# Patient Record
Sex: MALE | Race: OTHER
[De-identification: names, ages, dates, MRNs, and addresses within clinical notes are randomized per-mention and may not be internally consistent; named-entity substitution may affect disease eponyms.]

---

## 2018-09-24 ENCOUNTER — HOSPITAL ENCOUNTER (INPATIENT)
Dept: HOSPITAL 54 - ER | Age: 38
LOS: 4 days | Discharge: ANOTHER HOSPITAL | DRG: 834 | End: 2018-09-28
Attending: INTERNAL MEDICINE | Admitting: INTERNAL MEDICINE
Payer: COMMERCIAL

## 2018-09-24 VITALS — SYSTOLIC BLOOD PRESSURE: 109 MMHG | DIASTOLIC BLOOD PRESSURE: 65 MMHG

## 2018-09-24 VITALS — HEIGHT: 72 IN | BODY MASS INDEX: 27.09 KG/M2 | WEIGHT: 200 LBS

## 2018-09-24 DIAGNOSIS — A41.9: ICD-10-CM

## 2018-09-24 DIAGNOSIS — F10.10: ICD-10-CM

## 2018-09-24 DIAGNOSIS — E87.6: ICD-10-CM

## 2018-09-24 DIAGNOSIS — D68.9: ICD-10-CM

## 2018-09-24 DIAGNOSIS — E83.51: ICD-10-CM

## 2018-09-24 DIAGNOSIS — C92.00: Primary | ICD-10-CM

## 2018-09-24 DIAGNOSIS — R74.0: ICD-10-CM

## 2018-09-24 DIAGNOSIS — D53.9: ICD-10-CM

## 2018-09-24 DIAGNOSIS — E83.42: ICD-10-CM

## 2018-09-24 DIAGNOSIS — N17.0: ICD-10-CM

## 2018-09-24 DIAGNOSIS — A09: ICD-10-CM

## 2018-09-24 DIAGNOSIS — E87.1: ICD-10-CM

## 2018-09-24 DIAGNOSIS — D69.6: ICD-10-CM

## 2018-09-24 DIAGNOSIS — E87.3: ICD-10-CM

## 2018-09-24 DIAGNOSIS — D50.9: ICD-10-CM

## 2018-09-24 LAB
ALBUMIN SERPL BCP-MCNC: 3.5 G/DL (ref 3.4–5)
ALP SERPL-CCNC: 88 U/L (ref 46–116)
ALT SERPL W P-5'-P-CCNC: 96 U/L (ref 12–78)
APTT PPP: 27 SEC (ref 23–34)
APTT PPP: 29 SEC (ref 23–34)
AST SERPL W P-5'-P-CCNC: 38 U/L (ref 15–37)
BASOPHILS # BLD AUTO: 0.1 /CMM (ref 0–0.2)
BASOPHILS NFR BLD AUTO: 0.2 % (ref 0–2)
BASOPHILS NFR BLD MANUAL: 0 % (ref 0–2)
BILIRUB DIRECT SERPL-MCNC: 0.3 MG/DL (ref 0–0.2)
BILIRUB SERPL-MCNC: 0.8 MG/DL (ref 0.2–1)
BUN SERPL-MCNC: 15 MG/DL (ref 7–18)
CALCIUM SERPL-MCNC: 8 MG/DL (ref 8.5–10.1)
CHLORIDE SERPL-SCNC: 98 MMOL/L (ref 98–107)
CO2 SERPL-SCNC: 26 MMOL/L (ref 21–32)
CREAT SERPL-MCNC: 1.7 MG/DL (ref 0.6–1.3)
EOSINOPHIL NFR BLD AUTO: 0.2 % (ref 0–6)
EOSINOPHIL NFR BLD MANUAL: 0 % (ref 0–4)
GLUCOSE SERPL-MCNC: 121 MG/DL (ref 74–106)
HCT VFR BLD AUTO: 25 % (ref 39–51)
HGB BLD-MCNC: 9.1 G/DL (ref 13.5–17.5)
INR PPP: 1.27 (ref 0.85–1.15)
INR PPP: 1.38 (ref 0.85–1.15)
LYMPHOCYTES NFR BLD AUTO: 6.5 /CMM (ref 0.8–4.8)
LYMPHOCYTES NFR BLD AUTO: 8.9 % (ref 20–44)
LYMPHOCYTES NFR BLD MANUAL: 18 % (ref 16–48)
MCHC RBC AUTO-ENTMCNC: 37 G/DL (ref 31–36)
MCV RBC AUTO: 94 FL (ref 80–96)
MONOCYTES NFR BLD AUTO: 58 /CMM (ref 0.1–1.3)
MONOCYTES NFR BLD AUTO: 79.9 % (ref 2–12)
MONOCYTES NFR BLD MANUAL: 70 % (ref 0–11)
NEUTROPHILS # BLD AUTO: 7.8 /CMM (ref 1.8–8.9)
NEUTROPHILS NFR BLD AUTO: 10.8 % (ref 43–81)
NEUTS BAND NFR BLD MANUAL: 2 % (ref 0–5)
NEUTS SEG NFR BLD MANUAL: 10 % (ref 42–76)
PLATELET # BLD AUTO: 52 /CMM (ref 150–450)
POTASSIUM SERPL-SCNC: 3.6 MMOL/L (ref 3.5–5.1)
PROT SERPL-MCNC: 7.5 G/DL (ref 6.4–8.2)
RBC # BLD AUTO: 2.62 MIL/UL (ref 4.5–6)
RDW COEFFICIENT OF VARIATION: 19.9 (ref 11.5–15)
SODIUM SERPL-SCNC: 130 MMOL/L (ref 136–145)
WBC NRBC COR # BLD AUTO: 72.5 K/UL (ref 4.3–11)

## 2018-09-24 PROCEDURE — A4606 OXYGEN PROBE USED W OXIMETER: HCPCS

## 2018-09-24 PROCEDURE — C9113 INJ PANTOPRAZOLE SODIUM, VIA: HCPCS

## 2018-09-24 PROCEDURE — A6402 STERILE GAUZE <= 16 SQ IN: HCPCS

## 2018-09-24 PROCEDURE — A4216 STERILE WATER/SALINE, 10 ML: HCPCS

## 2018-09-24 PROCEDURE — Z7610: HCPCS

## 2018-09-24 RX ADMIN — SODIUM CHLORIDE PRN MLS/HR: 9 INJECTION, SOLUTION INTRAVENOUS at 21:09

## 2018-09-24 RX ADMIN — DEXTROSE MONOHYDRATE PRN MG: 50 INJECTION, SOLUTION INTRAVENOUS at 23:28

## 2018-09-24 RX ADMIN — ZOLPIDEM TARTRATE PRN MG: 5 TABLET, FILM COATED ORAL at 21:32

## 2018-09-24 RX ADMIN — Medication SCH MLS/HR: at 22:18

## 2018-09-24 NOTE — NUR
PT WAS FOUND TO BE VOMITING, VOMIT WAS BROWN WITH COFFEE LIKE EMESIS, CONSULTED CHARGE LAMBERTO SHARP WHO SAID GI IS AWARE. ZOFRAN WAS GIVEN, VITALS WERE TAKEN AND TEMP .5 WAS NOTED. 
WILL CONTINUE TO MONITOR

## 2018-09-24 NOTE — NUR
Patient to ed dt abdominal pain started 4 days ago; + tenderness LLQ pain- 
radiates to the RLQ; + fever, chills, and diarrhea x 3 days; + n/v

## 2018-09-24 NOTE — NUR
PT WAS RECEIVED FROM ER, ABLE TO AMBULATE WITH SLIGHT WEAKNESS, A/O X4, TEMP OF 98.4 NOTED 
BY CNA, FAMILY PRESENT AT BEDSIDE, WILL CONTINUE TO MONITOR AND ASSESS

## 2018-09-25 VITALS — DIASTOLIC BLOOD PRESSURE: 51 MMHG | SYSTOLIC BLOOD PRESSURE: 109 MMHG

## 2018-09-25 VITALS — DIASTOLIC BLOOD PRESSURE: 62 MMHG | SYSTOLIC BLOOD PRESSURE: 110 MMHG

## 2018-09-25 VITALS — DIASTOLIC BLOOD PRESSURE: 68 MMHG | SYSTOLIC BLOOD PRESSURE: 113 MMHG

## 2018-09-25 VITALS — SYSTOLIC BLOOD PRESSURE: 127 MMHG | DIASTOLIC BLOOD PRESSURE: 85 MMHG

## 2018-09-25 VITALS — DIASTOLIC BLOOD PRESSURE: 56 MMHG | SYSTOLIC BLOOD PRESSURE: 93 MMHG

## 2018-09-25 LAB
ALBUMIN SERPL BCP-MCNC: 2.7 G/DL (ref 3.4–5)
ALP SERPL-CCNC: 63 U/L (ref 46–116)
ALT SERPL W P-5'-P-CCNC: 78 U/L (ref 12–78)
APPEARANCE UR: (no result)
AST SERPL W P-5'-P-CCNC: 38 U/L (ref 15–37)
BASOPHILS # BLD AUTO: 0.1 /CMM (ref 0–0.2)
BILIRUB SERPL-MCNC: 0.7 MG/DL (ref 0.2–1)
BILIRUB UR QL STRIP: NEGATIVE
BLASTS NFR BLD MANUAL: 4 % (ref 0–0)
BUN SERPL-MCNC: 12 MG/DL (ref 7–18)
CALCIUM SERPL-MCNC: 7.2 MG/DL (ref 8.5–10.1)
CHLORIDE SERPL-SCNC: 105 MMOL/L (ref 98–107)
CO2 SERPL-SCNC: 22 MMOL/L (ref 21–32)
COLOR UR: YELLOW
CREAT SERPL-MCNC: 1.6 MG/DL (ref 0.6–1.3)
CREAT UR-MCNC: 211.3 MG/DL (ref 30–125)
GLUCOSE SERPL-MCNC: 107 MG/DL (ref 74–106)
GLUCOSE UR STRIP-MCNC: NEGATIVE MG/DL
HCT VFR BLD AUTO: 21 % (ref 39–51)
HEMOCCULT STL QL: POSITIVE
HGB BLD-MCNC: 8.4 G/DL (ref 13.5–17.5)
HGB UR QL STRIP: (no result) ERY/UL
IRON SERPL-MCNC: 77 UG/DL (ref 50–175)
KETONES UR STRIP-MCNC: NEGATIVE MG/DL
LEUKOCYTE ESTERASE UR QL STRIP: NEGATIVE
LYMPHOCYTES NFR BLD AUTO: 5.1 /CMM (ref 0.8–4.8)
LYMPHOCYTES NFR BLD MANUAL: 10 % (ref 16–48)
MAGNESIUM SERPL-MCNC: 1.6 MG/DL (ref 1.8–2.4)
MCHC RBC AUTO-ENTMCNC: 40 G/DL (ref 31–36)
MCV RBC AUTO: 94 FL (ref 80–96)
MONOCYTES NFR BLD AUTO: 66.9 /CMM (ref 0.1–1.3)
MONOCYTES NFR BLD MANUAL: 75 % (ref 0–11)
NEUTROPHILS # BLD AUTO: 9.4 /CMM (ref 1.8–8.9)
NEUTS BAND NFR BLD MANUAL: 3 % (ref 0–5)
NEUTS SEG NFR BLD MANUAL: 8 % (ref 42–76)
NITRITE UR QL STRIP: NEGATIVE
PH UR STRIP: 6 [PH] (ref 5–8)
PHOSPHATE SERPL-MCNC: 3.6 MG/DL (ref 2.5–4.9)
PLATELET # BLD AUTO: 31 /CMM (ref 150–450)
POTASSIUM SERPL-SCNC: 3.3 MMOL/L (ref 3.5–5.1)
PROT SERPL-MCNC: 6.4 G/DL (ref 6.4–8.2)
PROT UR QL STRIP: (no result) MG/DL
PROT UR-MCNC: 135.5 MG/DL (ref 0–11.9)
RBC # BLD AUTO: 2.24 MIL/UL (ref 4.5–6)
RBC #/AREA URNS HPF: (no result) /HPF (ref 0–2)
RDW COEFFICIENT OF VARIATION: 19.7 (ref 11.5–15)
SODIUM SERPL-SCNC: 138 MMOL/L (ref 136–145)
SODIUM UR-SCNC: 17 MMOL/L (ref 40–220)
TIBC SERPL-MCNC: 158 UG/DL (ref 250–450)
UROBILINOGEN UR STRIP-MCNC: 0.2 EU/DL
WBC #/AREA URNS HPF: (no result) /HPF (ref 0–3)
WBC NRBC COR # BLD AUTO: 81.6 K/UL (ref 4.3–11)

## 2018-09-25 RX ADMIN — SODIUM CHLORIDE PRN MLS/HR: 9 INJECTION, SOLUTION INTRAVENOUS at 05:34

## 2018-09-25 RX ADMIN — MAGNESIUM SULFATE IN DEXTROSE SCH MLS/HR: 10 INJECTION, SOLUTION INTRAVENOUS at 14:57

## 2018-09-25 RX ADMIN — SODIUM CHLORIDE SCH MG: 9 INJECTION, SOLUTION INTRAVENOUS at 09:37

## 2018-09-25 RX ADMIN — Medication SCH MLS/HR: at 20:47

## 2018-09-25 RX ADMIN — DEXTROSE MONOHYDRATE PRN MG: 50 INJECTION, SOLUTION INTRAVENOUS at 20:57

## 2018-09-25 RX ADMIN — Medication SCH MLS/HR: at 04:02

## 2018-09-25 RX ADMIN — POTASSIUM CHLORIDE SCH MLS/HR: 200 INJECTION, SOLUTION INTRAVENOUS at 11:07

## 2018-09-25 RX ADMIN — ZOLPIDEM TARTRATE PRN MG: 5 TABLET, FILM COATED ORAL at 22:55

## 2018-09-25 RX ADMIN — DEXTROSE MONOHYDRATE PRN MG: 50 INJECTION, SOLUTION INTRAVENOUS at 00:34

## 2018-09-25 RX ADMIN — LEVOFLOXACIN SCH MLS/HR: 500 INJECTION, SOLUTION INTRAVENOUS at 17:12

## 2018-09-25 RX ADMIN — POTASSIUM CHLORIDE SCH MLS/HR: 200 INJECTION, SOLUTION INTRAVENOUS at 12:55

## 2018-09-25 RX ADMIN — MAGNESIUM SULFATE IN DEXTROSE SCH MLS/HR: 10 INJECTION, SOLUTION INTRAVENOUS at 12:00

## 2018-09-25 RX ADMIN — Medication SCH MLS/HR: at 13:45

## 2018-09-25 NOTE — NUR
TELE RN OPENING NOTES:



RECEIVED PATIENT ASLEEP IN BED, EASILY AWAKENS. NPO STATUS MAINTAINED. ON TELE-MONITORING 
WITH CURRENT READING OF SR WITH HR OF 95, NO C/O CARDIAC DISTRESS NOTED. PT ON 02 VIA N/C @ 
3LPM, TOLERATING WELL WITH NO SOB NOTED. IV ACCESS ON LAC INTACT AND PATENT, IVF OF NS 
INFUSING @ 150ML/HR, NO S/S OF INFILTRATIONS NOTED. SAFETY MEASURES IN PLACE. CALL LIGHT 
WITHIN PT'S REACH. BED IN LOW/LOCKED POSITION WITH SIDE RAILS X 2UP. WILL CONTINUE TO 
MONITOR PT.

## 2018-09-25 NOTE — NUR
RN MS OPENING NOTES

RECEIVED PATIENT IN BED AWAKE. ALERT AND ORIENTED X4. VERBALLY RESPONSIVE. BREATHING EVEN 
AND UNLABORED. NO SOB NOTED. ON ROOM AIR. WITH COMPLAINTS OF SLIGHT DISCOMFORT ON ABDOMINAL, 
BUT NO PAIN. IV ACCESS INTACT AND PATENT WITH NS INFUSING AT 150ML/HR. SKIN DRY AND WARM TO 
TOUCH. AFEBRILE. ALL OTHER NEEDS ATTENDED TO. FAMILY AT BEDSIDE. SAFETY MEASURES IN PLACE. 
CALL LIGHT WITHIN REACH. WILL CONTINUE TO MONITOR.

## 2018-09-25 NOTE — NUR
PT HEART RATE WAS NOTED TO BE IN 120S AND WITH A SLIGHT TEMP .3 AND AN O2 SAT THAT WAS 
FLUCTUATING FROM 87-95, CHARGE LAMBERTO SHARP AND  WHO WAS  ON THE FLOOR WERE MADE AWARE OF 
THE SITUATION. 

1 ORDER OF REGLAN 10 MG Q8H IV WAS ORDERED BY DR. IBARRA BUT NO OTHER ORDERS GIVEN EXCEPT TO 
JUST MONITOR. WILL CONTINUE TO MONITOR AND ASSESS

## 2018-09-25 NOTE — NUR
PT TEMP WAS REASSESSED AFTER COOLING MEASURES IMPLEMENTED AND WAS NOTED TO HAVE A TEMP OF 
99.9. WILL CONTINUE TO MONITOR AND ASSESS

## 2018-09-25 NOTE — NUR
RN NOTES



RECEIVED CALL FROM MARIANNE OF LABORATORY THAT PT'S WBC IS 81.6 AND PLATELET IS 31, TRIED TO 
INPUT  IN CRITICAL VALUES IN INTERVENTION BUT WON'T LET ME BECAUSE WBC VALU WAS SO HIGH. DR RICE MADE AWARE WITH NO ORDERS MADE AT THIS TIME. WILL CONTINUE TO MONITOR.

## 2018-09-25 NOTE — NUR
RN NOTES



PT NOTED WITH TEMP OF 102F THIS MORNING, COOLING MEASURES GIVEN. WILL CONTINUE TO MONITOR

-------------------------------------------------------------------------------

Addendum: 09/25/18 at 0955 by IMELDA RODRIGUEZ RN

-------------------------------------------------------------------------------

CORRECTION: WRONG PROGRESS NOTES. PT IS AFEBRILE THIS MORNING

## 2018-09-25 NOTE — NUR
MS RN CLOSING NOTES



PT IS IN BED AT LOWEST AND LOCKED POSITION WITH SIDE RAILS UPX2, A/OX4, COMPLAINS OF PAIN 
MAINLY WHEN TRYING TO POOP AND ABDOMINAL CRAMPS, MOST RECENT STOOL WAS BLACK IN COLOR, TEMP 
IS CURRENTLY 99.9, O2 IS CURRENTLY 97 ON 3L VIA NC, LEFT ARM IV IS PATENT AND INTACT WITH 
FLUIDS RUNNING AT 125ML/HR, PT IS AMBULATORY BUT SLIGHTLY WEAK AND FREQUENTLY GETS UP TO GO 
TO THE BATHROOM, ON TELE MONITOR IN THE 100S, PT DID VOMIT LAST NIGHT AND ZOFRAN WAS GIVEN 
WHICH HELPED AND NO MORE EMESIS SINCE THEN, CURRENTLY NPO, ALL NEEDS ATTENED TO, WILL 
ENDORSE TO DAY SHIFT FOR SHANTELL.

## 2018-09-25 NOTE — NUR
MS RN CLOSING NOTES:



PATIENT AWAKE AND RESTING IN BED. A/O X4, SAME ABLE TO MAKE NEEDS AND CONCERNS KNOWN. 
AMBULATORY. PT ON ROOM AIR @ BREATHING EVEN AND UNLABORED, NO ACUTE DISTRESS NOTED 
THROUGHOUT THE NIGHT. PT TOLERATED CLEAR LIQUID DIET AT DINNER TIME, NO C/O ABDOMINAL PAIN, 
N & V. IV ACCESS ON LAC INTACT AND PATENT, IVF AS ORDERED INFUSING WELL, NO S/S OF 
INFILTRATIONS NOTED. ALL SAFETY MEASURES IN PLACE. CALL LIGHT WITHIN PT'S REACH. BED IN 
LOW/LOCKED POSITION WITH SIDE RAILS X 2UP. ALL NEEDS AND CARE ATTENDED WELL. WILL ENDORSE TO 
NIGHT SHIFT NURSE FOR SHANTELL. .

## 2018-09-25 NOTE — NUR
RN NOTES



PATIENT NOTED WITH LIQUID GREENISH BLACK BOWEL MOVEMENT. STOOL SPECIMEN COLLECTED AND CALLED 
LAB TO PICK SPECIMEN UP FROM [facility].

## 2018-09-26 VITALS — SYSTOLIC BLOOD PRESSURE: 106 MMHG | DIASTOLIC BLOOD PRESSURE: 59 MMHG

## 2018-09-26 VITALS — SYSTOLIC BLOOD PRESSURE: 102 MMHG | DIASTOLIC BLOOD PRESSURE: 56 MMHG

## 2018-09-26 VITALS — DIASTOLIC BLOOD PRESSURE: 69 MMHG | SYSTOLIC BLOOD PRESSURE: 122 MMHG

## 2018-09-26 LAB
ALBUMIN SERPL BCP-MCNC: 2.5 G/DL (ref 3.4–5)
ALP SERPL-CCNC: 53 U/L (ref 46–116)
ALT SERPL W P-5'-P-CCNC: 57 U/L (ref 12–78)
AST SERPL W P-5'-P-CCNC: 30 U/L (ref 15–37)
BILIRUB SERPL-MCNC: 0.5 MG/DL (ref 0.2–1)
BUN SERPL-MCNC: 13 MG/DL (ref 7–18)
CALCIUM SERPL-MCNC: 7.2 MG/DL (ref 8.5–10.1)
CHLORIDE SERPL-SCNC: 102 MMOL/L (ref 98–107)
CK SERPL-CCNC: 79 U/L (ref 39–308)
CO2 SERPL-SCNC: 22 MMOL/L (ref 21–32)
CREAT SERPL-MCNC: 1.5 MG/DL (ref 0.6–1.3)
GLUCOSE SERPL-MCNC: 108 MG/DL (ref 74–106)
HCT VFR BLD AUTO: 20 % (ref 39–51)
HGB BLD-MCNC: 6.6 G/DL (ref 13.5–17.5)
LYMPHOCYTES NFR BLD MANUAL: 23 % (ref 16–48)
MAGNESIUM SERPL-MCNC: 2.1 MG/DL (ref 1.8–2.4)
MCHC RBC AUTO-ENTMCNC: 33 G/DL (ref 31–36)
MCV RBC AUTO: 97 FL (ref 80–96)
MONOCYTES NFR BLD MANUAL: 68 % (ref 0–11)
NEUTS SEG NFR BLD MANUAL: 9 % (ref 42–76)
PHOSPHATE SERPL-MCNC: 2 MG/DL (ref 2.5–4.9)
PLATELET # BLD AUTO: 36 /CMM (ref 150–450)
POTASSIUM SERPL-SCNC: 2.9 MMOL/L (ref 3.5–5.1)
PROT SERPL-MCNC: 6 G/DL (ref 6.4–8.2)
RBC # BLD AUTO: 2.04 MIL/UL (ref 4.5–6)
RDW COEFFICIENT OF VARIATION: 21.8 (ref 11.5–15)
SODIUM SERPL-SCNC: 134 MMOL/L (ref 136–145)
WBC NRBC COR # BLD AUTO: 98.5 K/UL (ref 4.3–11)

## 2018-09-26 PROCEDURE — 07DR3ZX EXTRACTION OF ILIAC BONE MARROW, PERCUTANEOUS APPROACH, DIAGNOSTIC: ICD-10-PCS | Performed by: INTERNAL MEDICINE

## 2018-09-26 RX ADMIN — POTASSIUM CHLORIDE SCH MEQ: 1500 TABLET, EXTENDED RELEASE ORAL at 12:16

## 2018-09-26 RX ADMIN — HYDROXYUREA SCH MG: 500 CAPSULE ORAL at 18:41

## 2018-09-26 RX ADMIN — POTASSIUM CHLORIDE SCH MEQ: 1500 TABLET, EXTENDED RELEASE ORAL at 10:56

## 2018-09-26 RX ADMIN — PIPERACILLIN SODIUM AND TAZOBACTAM SODIUM SCH MLS/HR: .375; 3 INJECTION, POWDER, LYOPHILIZED, FOR SOLUTION INTRAVENOUS at 21:53

## 2018-09-26 RX ADMIN — ALPRAZOLAM PRN MG: 0.25 TABLET ORAL at 10:32

## 2018-09-26 RX ADMIN — SODIUM CHLORIDE, SODIUM LACTATE, POTASSIUM CHLORIDE, AND CALCIUM CHLORIDE PRN MLS/HR: .6; .31; .03; .02 INJECTION, SOLUTION INTRAVENOUS at 12:30

## 2018-09-26 RX ADMIN — DEXTROSE MONOHYDRATE SCH MLS/HR: 50 INJECTION, SOLUTION INTRAVENOUS at 22:31

## 2018-09-26 RX ADMIN — ALLOPURINOL SCH MG: 100 TABLET ORAL at 18:40

## 2018-09-26 RX ADMIN — Medication SCH MLS/HR: at 05:00

## 2018-09-26 RX ADMIN — Medication SCH MLS/HR: at 12:31

## 2018-09-26 RX ADMIN — SODIUM CHLORIDE SCH MG: 9 INJECTION, SOLUTION INTRAVENOUS at 09:07

## 2018-09-26 RX ADMIN — POTASSIUM CHLORIDE SCH MEQ: 1500 TABLET, EXTENDED RELEASE ORAL at 09:52

## 2018-09-26 RX ADMIN — SODIUM CHLORIDE PRN MLS/HR: 9 INJECTION, SOLUTION INTRAVENOUS at 02:46

## 2018-09-26 RX ADMIN — LEVOFLOXACIN SCH MLS/HR: 500 INJECTION, SOLUTION INTRAVENOUS at 18:40

## 2018-09-26 RX ADMIN — Medication PRN MG: at 15:40

## 2018-09-26 NOTE — NUR
RN NOTES

PATIENT IN BED ASLEEP, NO S/SX OF DISTRESS NOTED, IVF INFUSING AND TOLERATING WELL, SAFETY 
MEASURES IN PLACED, CALL LIGHT WITHIN REACH, WILL CONTINUE TO MONITOR.

## 2018-09-26 NOTE — NUR
RN NOTES

DR. SHARON RICE MADE ROUNDS, ORDERED A CONSULT WITH DR. CABRAL, NO BLOOD TRANSFUSION ORDER AT 
THIS TIME.

## 2018-09-26 NOTE — NUR
RN MS CLOSING NOTES

PATIENT IN BED ASLEEP. EASILY AROUSABLE. NO ACUTE CHANGES THROUGHOUT SHIFT. BREATHING EVEN 
AND UNLABORED. NO SOB NOTED. ON ROOM AIR. WITH COMPLAINTS OF SLIGHT DISCOMFORT ON ABDOMINAL, 
BUT NO PAIN. IV ACCESS INTACT AND PATENT WITH NS INFUSING AT 150ML/HR. SKIN DRY AND WARM TO 
TOUCH. AFEBRILE. ALL OTHER NEEDS ATTENDED TO. SAFETY MEASURES IN PLACE. CALL LIGHT WITHIN 
REACH. WILL ENDORSE TO ONCOMING NURSE FOR CONTINUITY OF CARE.

## 2018-09-26 NOTE — NUR
RN NOTES

PATIENT SEEN BY DR. CABRAL, RECEIVED CONSENT FOR A BONE MARROW BIOPSY, PATIENT TOLERATED 
PROCEDURE WELL. SPECIMENS SENT TO PATHOLOGY. PATIENT IS IN NO DISTRESS, WILL BE PLACED ON 
NEUTROPENIC PRECAUTION. PER DR. CABRAL, TRANSFUSE PATIENT AS ORDERED, BLOOD TRANSFUSION 
CONSENT SIGNED, DENIES PAIN OR DISCOMFORT AT THIS TIME. NEEDS ATTENDED, CALL LIGHT WITHIN 
REACH, WILL ENDORSE TO NIGHT SHIFT FOR SHANTELL.

## 2018-09-26 NOTE — NUR
RN NOTES

RECEIVED A CALL FROM LAB RE: CRITICAL LAB VALUES, PAGED DR. SHARON RICE THRU EPIC EXCHANGE, 
WAITING FOR A CALL BACK.

## 2018-09-26 NOTE — NUR
RN MS OPENING NOTES

RECEIVED PATIENT IN BED AWAKE. ALERT AND ORIENTED X4. VERBALLY RESPONSIVE. BREATHING EVEN 
AND UNLABORED. NO SOB NOTED. ON ROOM AIR. WITH COMPLAINTS OF PAIN ON ABDOMINAL, BUT NO 
REFUSES PAIN MEDICATION. IV ACCESS INTACT AND PATENT WITH NS + KCL 40MEQ INFUSING AT 
100ML/HR. SKIN DRY AND WARM TO TOUCH. NOTED WITH 100.8F TEMPERATURE. COOLING MEASURES IN 
PLACE. ALL OTHER NEEDS ATTENDED TO. FAMILY AT BEDSIDE. SAFETY MEASURES IN PLACE. CALL LIGHT 
WITHIN REACH. WILL CONTINUE TO MONITOR.

## 2018-09-27 VITALS — DIASTOLIC BLOOD PRESSURE: 78 MMHG | SYSTOLIC BLOOD PRESSURE: 113 MMHG

## 2018-09-27 VITALS — SYSTOLIC BLOOD PRESSURE: 121 MMHG | DIASTOLIC BLOOD PRESSURE: 67 MMHG

## 2018-09-27 VITALS — DIASTOLIC BLOOD PRESSURE: 73 MMHG | SYSTOLIC BLOOD PRESSURE: 106 MMHG

## 2018-09-27 VITALS — SYSTOLIC BLOOD PRESSURE: 107 MMHG | DIASTOLIC BLOOD PRESSURE: 77 MMHG

## 2018-09-27 VITALS — DIASTOLIC BLOOD PRESSURE: 66 MMHG | SYSTOLIC BLOOD PRESSURE: 129 MMHG

## 2018-09-27 VITALS — SYSTOLIC BLOOD PRESSURE: 116 MMHG | DIASTOLIC BLOOD PRESSURE: 69 MMHG

## 2018-09-27 VITALS — DIASTOLIC BLOOD PRESSURE: 73 MMHG | SYSTOLIC BLOOD PRESSURE: 112 MMHG

## 2018-09-27 VITALS — SYSTOLIC BLOOD PRESSURE: 122 MMHG | DIASTOLIC BLOOD PRESSURE: 73 MMHG

## 2018-09-27 VITALS — SYSTOLIC BLOOD PRESSURE: 112 MMHG | DIASTOLIC BLOOD PRESSURE: 70 MMHG

## 2018-09-27 VITALS — SYSTOLIC BLOOD PRESSURE: 131 MMHG | DIASTOLIC BLOOD PRESSURE: 78 MMHG

## 2018-09-27 VITALS — DIASTOLIC BLOOD PRESSURE: 71 MMHG | SYSTOLIC BLOOD PRESSURE: 108 MMHG

## 2018-09-27 VITALS — SYSTOLIC BLOOD PRESSURE: 116 MMHG | DIASTOLIC BLOOD PRESSURE: 61 MMHG

## 2018-09-27 LAB
ALBUMIN SERPL BCP-MCNC: 2.4 G/DL (ref 3.4–5)
ALBUMIN SERPL ELPH-MCNC: 2.9 G/DL (ref 2.9–4.4)
ALBUMIN/GLOB SERPL: 1.1 {RATIO} (ref 0.7–1.7)
ALP SERPL-CCNC: 53 U/L (ref 46–116)
ALPHA1 GLOB SERPL ELPH-MCNC: 0.3 G/DL (ref 0–0.4)
ALPHA2 GLOB SERPL ELPH-MCNC: 0.5 G/DL (ref 0.4–1)
ALT SERPL W P-5'-P-CCNC: 48 U/L (ref 12–78)
APTT PPP: 35 SEC (ref 23–34)
AST SERPL W P-5'-P-CCNC: 29 U/L (ref 15–37)
B-GLOBULIN SERPL ELPH-MCNC: 0.6 G/DL (ref 0.7–1.3)
BASE EXCESS BLDA CALC-SCNC: -3.3 MMOL/L
BASOPHILS # BLD AUTO: 0.1 /CMM (ref 0–0.2)
BASOPHILS # BLD AUTO: 0.3 /CMM (ref 0–0.2)
BASOPHILS NFR BLD AUTO: 0.1 % (ref 0–2)
BASOPHILS NFR BLD AUTO: 0.4 % (ref 0–2)
BILIRUB DIRECT SERPL-MCNC: 0.3 MG/DL (ref 0–0.2)
BILIRUB SERPL-MCNC: 0.7 MG/DL (ref 0.2–1)
BLASTS NFR BLD MANUAL: 9 % (ref 0–0)
BUN SERPL-MCNC: 12 MG/DL (ref 7–18)
CALCIUM SERPL-MCNC: 7.4 MG/DL (ref 8.5–10.1)
CHLORIDE SERPL-SCNC: 104 MMOL/L (ref 98–107)
CO2 SERPL-SCNC: 24 MMOL/L (ref 21–32)
CREAT SERPL-MCNC: 1.3 MG/DL (ref 0.6–1.3)
D DIMER PPP FEU-MCNC: 2.11 MG/L(FEU (ref 0.17–0.5)
DO-HGB MFR BLDA: 129.8 MMHG
EOSINOPHIL NFR BLD AUTO: 0.2 % (ref 0–6)
EOSINOPHIL NFR BLD AUTO: 0.2 % (ref 0–6)
FIBRINOGEN PPP-MCNC: 449 MG/DL (ref 213–485)
GAMMA GLOB SERPL ELPH-MCNC: 1.2 G/DL (ref 0.4–1.8)
GLOBULIN SER CALC-MCNC: 2.6 G/DL (ref 2.2–3.9)
GLUCOSE SERPL-MCNC: 120 MG/DL (ref 74–106)
HCT VFR BLD AUTO: 20 % (ref 39–51)
HCT VFR BLD AUTO: 23 % (ref 39–51)
HGB BLD-MCNC: 6.7 G/DL (ref 13.5–17.5)
HGB BLD-MCNC: 7.9 G/DL (ref 13.5–17.5)
INHALED O2 CONCENTRATION: 32 %
INHALED O2 FLOW RATE: 3 L/MIN (ref 0–30)
INR PPP: 1.34 (ref 0.87–1.13)
LYMPHOCYTES NFR BLD AUTO: 6 /CMM (ref 0.8–4.8)
LYMPHOCYTES NFR BLD AUTO: 6.3 /CMM (ref 0.8–4.8)
LYMPHOCYTES NFR BLD AUTO: 7.3 % (ref 20–44)
LYMPHOCYTES NFR BLD AUTO: 7.6 % (ref 20–44)
LYMPHOCYTES NFR BLD MANUAL: 10 % (ref 16–48)
MCHC RBC AUTO-ENTMCNC: 33 G/DL (ref 31–36)
MCHC RBC AUTO-ENTMCNC: 35 G/DL (ref 31–36)
MCV RBC AUTO: 94 FL (ref 80–96)
MCV RBC AUTO: 98 FL (ref 80–96)
MONOCYTES NFR BLD AUTO: 71.6 /CMM (ref 0.1–1.3)
MONOCYTES NFR BLD AUTO: 77.7 /CMM (ref 0.1–1.3)
MONOCYTES NFR BLD AUTO: 90.2 % (ref 2–12)
MONOCYTES NFR BLD AUTO: 91.1 % (ref 2–12)
MONOCYTES NFR BLD MANUAL: 78 % (ref 0–11)
NEUTROPHILS # BLD AUTO: 1.1 /CMM (ref 1.8–8.9)
NEUTROPHILS # BLD AUTO: 1.2 /CMM (ref 1.8–8.9)
NEUTROPHILS NFR BLD AUTO: 1.3 % (ref 43–81)
NEUTROPHILS NFR BLD AUTO: 1.6 % (ref 43–81)
NEUTS SEG NFR BLD MANUAL: 3 % (ref 42–76)
PCO2 TEMP ADJ BLDA: 24.4 MMHG (ref 35–45)
PH TEMP ADJ BLDA: 7.51 [PH] (ref 7.35–7.45)
PHOSPHATE SERPL-MCNC: 3.1 MG/DL (ref 2.5–4.9)
PLATELET # BLD AUTO: 32 /CMM (ref 150–450)
PLATELET # BLD AUTO: 36 /CMM (ref 150–450)
PLATELET # BLD AUTO: 36 /CMM (ref 150–450)
PO2 TEMP ADJ BLDA: 69.8 MMHG (ref 75–100)
POTASSIUM SERPL-SCNC: 3.3 MMOL/L (ref 3.5–5.1)
PROT SERPL-MCNC: 6.1 G/DL (ref 6.4–8.2)
PTH-INTACT SERPL-MCNC: 51 PG/ML (ref 15–65)
RBC # BLD AUTO: 2.07 MIL/UL (ref 4.5–6)
RBC # BLD AUTO: 2.44 MIL/UL (ref 4.5–6)
RDW COEFFICIENT OF VARIATION: 20.6 (ref 11.5–15)
RDW COEFFICIENT OF VARIATION: 21.7 (ref 11.5–15)
SAO2 % BLDA: 93.6 % (ref 92–98.5)
SODIUM SERPL-SCNC: 137 MMOL/L (ref 136–145)
VENTILATION MODE VENT: (no result)
WBC NRBC COR # BLD AUTO: 79.3 K/UL (ref 4.3–11)
WBC NRBC COR # BLD AUTO: 85.4 K/UL (ref 4.3–11)

## 2018-09-27 PROCEDURE — 30233N1 TRANSFUSION OF NONAUTOLOGOUS RED BLOOD CELLS INTO PERIPHERAL VEIN, PERCUTANEOUS APPROACH: ICD-10-PCS | Performed by: NURSE PRACTITIONER

## 2018-09-27 PROCEDURE — 30233R1 TRANSFUSION OF NONAUTOLOGOUS PLATELETS INTO PERIPHERAL VEIN, PERCUTANEOUS APPROACH: ICD-10-PCS | Performed by: NURSE PRACTITIONER

## 2018-09-27 RX ADMIN — MEROPENEM SCH MLS/HR: 1 INJECTION INTRAVENOUS at 20:07

## 2018-09-27 RX ADMIN — ALLOPURINOL SCH MG: 100 TABLET ORAL at 18:06

## 2018-09-27 RX ADMIN — ACETAMINOPHEN PRN MG: 325 TABLET ORAL at 10:43

## 2018-09-27 RX ADMIN — ACETAMINOPHEN PRN MG: 325 TABLET ORAL at 01:47

## 2018-09-27 RX ADMIN — Medication PRN MG: at 00:15

## 2018-09-27 RX ADMIN — ALPRAZOLAM PRN MG: 0.25 TABLET ORAL at 03:22

## 2018-09-27 RX ADMIN — DEXTROSE MONOHYDRATE SCH MLS/HR: 50 INJECTION, SOLUTION INTRAVENOUS at 08:35

## 2018-09-27 RX ADMIN — ACETAMINOPHEN PRN MG: 325 TABLET ORAL at 18:06

## 2018-09-27 RX ADMIN — HYDROXYUREA SCH MG: 500 CAPSULE ORAL at 08:34

## 2018-09-27 RX ADMIN — SODIUM CHLORIDE SCH MG: 9 INJECTION, SOLUTION INTRAVENOUS at 08:34

## 2018-09-27 RX ADMIN — ALLOPURINOL SCH MG: 100 TABLET ORAL at 12:49

## 2018-09-27 RX ADMIN — DEXTROSE MONOHYDRATE SCH MLS/HR: 50 INJECTION, SOLUTION INTRAVENOUS at 21:50

## 2018-09-27 RX ADMIN — SODIUM CHLORIDE, SODIUM LACTATE, POTASSIUM CHLORIDE, AND CALCIUM CHLORIDE PRN MLS/HR: .6; .31; .03; .02 INJECTION, SOLUTION INTRAVENOUS at 14:28

## 2018-09-27 RX ADMIN — MEROPENEM SCH MLS/HR: 1 INJECTION INTRAVENOUS at 14:39

## 2018-09-27 RX ADMIN — ALLOPURINOL SCH MG: 100 TABLET ORAL at 08:34

## 2018-09-27 RX ADMIN — PIPERACILLIN SODIUM AND TAZOBACTAM SODIUM SCH MLS/HR: .375; 3 INJECTION, POWDER, LYOPHILIZED, FOR SOLUTION INTRAVENOUS at 05:52

## 2018-09-27 NOTE — NUR
MS RN NOTES

BLOOD TRANSFUSION ONGOING, PATIENT WITH STABLE VITAL SIGNS. NO ACUTE DISTRESS NOTED. NO 
ADVERSE REACTION OF BLOOD TRANSFUSION NOTED. BREATHING UNLABORED. IV ACCESS PATENT AND 
INTACT. NO REDNESS OR SWELLING NOTED. WILL CONTINUE TO MONITOR ACCORDINGLY.

## 2018-09-27 NOTE — NUR
RN NOTES: 

STARTED PLATELET TRANSFUSION AS ORDERED AS TEMP BELOW 100.4 TO MONITOR FOR TRANSFUSION 
REACTION. ACCORDING TO PATIENT'S SISTER KEO, THEY WERE ABLE TO GET A ROOM FOR PATIENT AT 
[facility] WHICH CAN RENDERED HIGHER LEVEL OF CARE TO PATIENT WITH CHEMOTHERAPY NEEDS( THIS 
IS CARE OF [facility] SUPERVISOR. ALSO COORDINATED WITH COLBY,  REGARDING THIS AND 
RECEIVED INSTRUCTIONS TO CALL [facility] TRANSFER [facility] AND COORDINATE RE CASE AND FAX NEEDED 
PAPERWORKS TO TRANSFER CENTER. SPOKE TO SERAFIN FROM [facility] TRANSFER [facility] AND RECEIVED 
SPECIFIC INSTRUCTIONS AND PAPERWORK THAT NEEDED TO BE FILLED UP. AND THEN AWAIT FOR AN 
ACCEPTING PHYSICIAN TO ACCEPT PATIENT. KEO AND PATIENT AWARE OF SITUATION.



RECEIVED PAPER WORK FROM TRANSFER CENTER. FILLED UP PAPER WORK AND CALLED ON CALL MD DR BURNS RE PATIENT AND FAMILY'S PLAN OF TRANSFER. PER MD HE IS AUTHORIZING DISCHARGE AND 
TRANSFER BUT DC INSTRUCTIONS WILL BE MADE BY ATTENDING PHYSICIAN, SHARON RICE IN AM. DR BURNS AGREED FOR DISCHARGE ORDERS.  AFTER THIS RECEIVED A CALL FROM [facility] 
TRANSFER [place] AND SPOKE TO SHARON. ACCORDING TO SHARON, PATIENT IS BEING ACCEPTED AND HAS A BED 
RM 1422-1 AND INSTRUCTED TO GIVE REPORT TO RN WHO WILL RECEIVE PATIENT AT [facility]. KEO AND PATIENT MADE AWARE, KEO DECIDING REGARDING TRANSFER THAT THEY 
PREFER TO BRING PATIENT TO [facility] FOR THERAPY. FAXED ALL PAPERWORK NECESSARY TO [facility] TRANSFER 
CENTER. AWAITING FOR DECISION FROM [facility]. [facility] KEPT ON HOLD AT THIS TIME.

## 2018-09-27 NOTE — NUR
RN NOTES 

RECEIVED PT FROM [facility]W [facility] STATUS , A/Ox4, ON 2L O2 N/C , RESPIRATION EVEN AND UNLABORED,  ON 
TELE SR HR IN 89'S , PT RECEIVING ONE UNIT OF PRBC AT HIS TIME, VSS STABLE, L AC IV SITE G 
18 CLEAN AND DRY, SUPPORTIVE FAMILY AT THE BEDSIDE,  SR UP x3, CALL LIGHT WITHIN EASY REACH, 
BED LOCKED AND IN LOWEST POSITION, CONTINUE TO MONITOR .

## 2018-09-27 NOTE — NUR
RN MS NOTES

CHECKED PATIENT'S TEMPERATURE RECTALLY AGAIN.

1ST ATTEMPT: 100.6F

2ND ATTEMPT: 100.5F

3RD ATTEMPT: 100.5F

BLOOD TRANSFUSION HELD, PER ORDER, DUE TO TEMPERATURE OF MORE THAN 100.4F

## 2018-09-27 NOTE — NUR
MS RN NOTES

BLOOD TRANSFUSION ONGOING, PATIENT ALERT ORIENTED X4 ,NO ACUTE DISTRESS NOTED, WITH STABLE 
VITAL SIGNS.NO ADVERSE REACTION OF BLOOD TRANSFUSION NOTED. BREATHING UNLABORED. IV ACCESS 
PATENT AND INTACT. NO REDNESS OR SWELLING NOTED. WILL CONTINUE TO MONITOR ACCORDINGLY.

## 2018-09-27 NOTE — NUR
RN MS CLOSING NOTES

PATIENT IN BED ASLEEP. EASILY AROUSABLE. FAMILY AT BEDSIDE. BLOOD TRANSFUSION HELD DUE TO 
HIGH TEMPERATURES. BREATHING EVEN AND UNLABORED. NO SOB NOTED. ON 2L OXYGEN VIA NC - 
SATURATING WELL. WITH OCCASIONAL COMPLAINTS OF PAIN ON THE ABDOMEN - MORPHINE GIVEN LAST 
NIGHT AT 0015 - EFFECTIVE. IV ACCESS INTACT AND PATENT WITH NS + KCL 40MEQ INFUSING AT 
100ML/HR. . ALL OTHER NEEDS ATTENDED TO. SAFETY MEASURES IN PLACE. CALL LIGHT WITHIN REACH. 
WILL ENDORSE TO ONCOMING NURSE FOR CONTINUITY OF CARE.

## 2018-09-27 NOTE — NUR
RN NOTES:

RECEIVED CALL BACK FROM [facility]SF[facility] CARE OF SERAFIN AND SPOKE TO CHARGE RN, [place]. 
ACCORDING TO SERAFIN PATIENT HAS SOME INSURANCE ISSUES AND THAT HE MAY NOT BE ELIGIBLE WITH 
CHEMOTHERAPY WITH HIS CURRENT INSURANCE, BUT THAT THEY WILL HOLD ON WITH THE FILE TO FOLLOW 
UP IN THE MORNING TO VERIFY INSURANCE. KEO AND PATIENT MADE AWARE THAT IT WILL BE 
FOLLOWED UP IN AM. KEO AND PATIENT DECIDED THAT THEY WILL JUST GO TO [facility] FOR NOW 
AND EVENTUALLY TRANSFER TO [facility] FROM THERE. SERAFIN FROM [facility] MADE AWARE. AS 
PER HIM,  IF THIS IS THE CASE [facility] NEEDS TO CREATE A WHOLE NEW TRANSACTION 
WITH THEM. PATIENT AND FAMILY MADE AWARE THAT THEY NEED TO COORDINATE WITH  IN 
[facility] REGARDING THIS. REPORT GIVEN TO JESS IN [facility]. [facility] 
TRANSPORT SERVICE REQUESTED AS WELL,  AT 0130. PLATELET TRANSFUSED. NO REACTIONS 
NOTED. IV ANTIBIOTICS GIVEN AS ORDERED. CONTINUOUSLY MONITORED PATIENT.

## 2018-09-27 NOTE — NUR
MS RN NOTES

BLOOD TRANSFUSION ONGOING, PATIENT WITH STABLE VITAL SIGNS.NO ADVERSE REACTION OF BLOOD 
TRANSFUSION NOTED. BREATHING UNLABORED.NO ACUTE DISTRESS NOTED. IV ACCESS PATENT AND INTACT. 
NO REDNESS OR SWELLING NOTED. WILL CONTINUE TO MONITOR ACCORDINGLY.

## 2018-09-27 NOTE — NUR
RN MS NOTES

TOOK VITAL SIGNS PRIOR TO BLOOD TRANSFUSION. TEMPERATURE NOTED TO .0F ORALLY. BLOOD 
TRANSFUSION HELD. PAGED DR. BURNS TO MAKE AWARE, AND POSSIBLY GET TYLENOL ORDER. WAITING 
FOR CALL BACK.

## 2018-09-27 NOTE — NUR
MS RN NOTES

PATIENT TRANSPORTED TO [facility] FOR CLOSE MONITORING VIA ACLS PROTOCOL. BLOOD TRANSFUSION ON 
GOING. ALERT ORIENTED X4, NO ACUTE DISTRESS NOTED, WITH STABLE VITAL SIGNS.NO ADVERSE 
REACTION OF BLOOD TRANSFUSION NOTED. BREATHING UNLABORED. IV ACCESS PATENT AND INTACT. NO 
REDNESS OR SWELLING NOTED. ALL BELONGINGS SENT WITH THE PATIENT. ENDORSED TO [facility] NURSE 
BRITTNY.

## 2018-09-27 NOTE — NUR
RN MS NOTES

CHECKED PATIENT'S TEMPERATURE RECTALLY. 

1ST ATTEMPT: 104.0F

2ND ATTEMPT: 103.9F

3RD ATTEMPT: 103.9F

BLOOD TRANSFUSION HELD, PER ORDER, DUE TO TEMPERATURE OF MORE THAN 100.4F.

## 2018-09-27 NOTE — NUR
MS RN NOTES

BLOOD TRANSFUSION STARTED, PATIENT ALERT ORIENTED X4 ,NO ACUTE DISTRESS NOTED, WITH STABLE 
VITAL SIGNS. IV ACCESS PATENT AND INTACT. NO REDNESS OR SWELLING NOTED. WILL CONTINUE TO 
MONITOR ACCORDINGLY.

## 2018-09-27 NOTE — NUR
RN NOTES 

PT STABLE , NO DISTRESS NOTED, SUPPORTIVE FAMILY AT THE BEDSIDE . WILL ENDOSE TO NIGHT SHIFT 
NURSE FOR CONTINUITY OF CARE .

## 2018-09-27 NOTE — NUR
RN OPENING NOTES:

RECEIVED PATIENT ON BED AWAKE AND ALERT ABLE TO AMBULATE WITH STANDBY ASSIST. ON ROOM AIR, 
MAY NEED SUPPLEMENTAL O2. NOT IN APPARENT DISTRESS AT THIS TIME. NO COMPLAINTS OF PAIN 
ALTHOUGH WITH TENDERNESS ON ABDOMEN WHEN PALPATED. SINUS TACH ON THE MONITOR HR  BPM. 
IV ACCESS ON LAC PATENT AND INTACT, IVF INFUSING AS ORDERED. WITH FAMILY AT BEDSIDE. SAFETY 
MEASURES ENSURED. CALL LIGHT IN REACH. CONTINUOUSLY MONITORED ACCORDINGLY. DR CABRAL IN, NEW 
ORDERS NOTED AND CARRIED OUT. TO TRANSFUSE 1 UNIT OF PLATELET AS ORDERED ONCE TEMP SUBSIDE.

## 2018-09-27 NOTE — NUR
MS RN NOTES

PATIENT LYING IN BED, HOB, ALERT ORIENTED X 4. FAMILY AT BEDSIDE. NO ACUTE DISTRESS NOTED. 
NO SOB NOTED. BREATHING UNLABORED.IVF INFUSING WELL, NO REDNESS OR SWELLING NOTED. SAFETY 
MEASURES IN PLACE. CALL LIGHT WITHIN REACH. WILL CONTINUE TO MONITOR ACCORDINGLY.

## 2018-09-27 NOTE — NUR
RN MS NOTES 

DR. BURNS PAGED BACK WITH ORDERS FOR TYLENOL 650MG PO AND TO GIVE BLOOD WHEN TEMPERATURE 
.4F OR LESS. ORDERS NOTED AND CARRIED OUT.

## 2018-09-27 NOTE — NUR
RN NOTES 

UNABLE TO TRANSFUSE PLATELETS DUE TO T=102.8, DR RICE NOTIFED , NEW ORDER RECEIVED , 
CONTINUE TO MONITOR

## 2018-09-28 VITALS — SYSTOLIC BLOOD PRESSURE: 123 MMHG | DIASTOLIC BLOOD PRESSURE: 74 MMHG

## 2018-09-28 LAB
HIV1 RNA # SERPL NAA+PROBE: <20 COPIES/ML
HIV1 RNA SERPL NAA+PROBE-LOG#: (no result) {LOG_COPIES}/ML

## 2018-09-28 RX ADMIN — ACETAMINOPHEN PRN MG: 325 TABLET ORAL at 02:24

## 2018-09-28 NOTE — NUR
RN NOTES: EXIT CARE DONE,PLATELET TRANSFUSION DONE. BELONGINGS VERIFIED WITH PATIENT. ALL 
BELONGINGS WITH PT AND FAMILY. PATIENT STILL HAVING DIARRHEA DESCRIBED TO BE MUCOID 
YELLOWISH TO DARK BROWN TO BLACK IN COLOR. PATIENT WITH FLUCTUATING TEMP/ ON AND OFF FEVER. 



0130 SERAFIN FROM [facility]SF[facility] CALLED AGAIN AND ASKED IF PATIENT HAS CONFIRMED BD ON 
[employer]. PER SERAFIN THEY ARE "HOPING TO GET A CALL FROM [employer]." 

0230PATIENT PICKED UP BY EMT'S PATIENT WITH FEVER  TYLENOL DUE AND GIVEN PRIOR TO 
TRANSPORT. CONTINUED ON REVERSE ISOLATION. PATIENT LEFT UNIT NOT IN APPARENT DISTRESS. 
REMINDED FAMILY TO FOLLOW UP WITH I IN AM.

## 2018-09-29 LAB
C3 SERPL-MCNC: 48 MG/DL (ref 82–167)
C4 SERPL-MCNC: 20 MG/DL (ref 14–44)
HAPTOGLOBIN: 231 MG/DL (ref 34–200)